# Patient Record
Sex: FEMALE | URBAN - METROPOLITAN AREA
[De-identification: names, ages, dates, MRNs, and addresses within clinical notes are randomized per-mention and may not be internally consistent; named-entity substitution may affect disease eponyms.]

---

## 2023-05-10 ENCOUNTER — OFFICE VISIT (OUTPATIENT)
Dept: OBGYN CLINIC | Age: 38
End: 2023-05-10
Payer: COMMERCIAL

## 2023-05-10 VITALS
HEIGHT: 64 IN | WEIGHT: 270 LBS | BODY MASS INDEX: 46.1 KG/M2 | DIASTOLIC BLOOD PRESSURE: 78 MMHG | SYSTOLIC BLOOD PRESSURE: 126 MMHG

## 2023-05-10 DIAGNOSIS — N93.9 ABNORMAL UTERINE BLEEDING (AUB): Primary | ICD-10-CM

## 2023-05-10 DIAGNOSIS — D21.9 FIBROIDS: ICD-10-CM

## 2023-05-10 DIAGNOSIS — Z30.011 ENCOUNTER FOR INITIAL PRESCRIPTION OF CONTRACEPTIVE PILLS: ICD-10-CM

## 2023-05-10 PROCEDURE — 99204 OFFICE O/P NEW MOD 45 MIN: CPT | Performed by: OBSTETRICS & GYNECOLOGY

## 2023-05-10 RX ORDER — M-VIT,TX,IRON,MINS/CALC/FOLIC 27MG-0.4MG
1 TABLET ORAL DAILY
COMMUNITY

## 2023-05-10 RX ORDER — ATOMOXETINE 40 MG/1
40 CAPSULE ORAL DAILY
COMMUNITY
Start: 2023-03-27

## 2023-05-10 RX ORDER — NORETHINDRONE ACETATE AND ETHINYL ESTRADIOL 1MG-20(21)
1 KIT ORAL DAILY
Qty: 1 PACKET | Refills: 3 | Status: SHIPPED | OUTPATIENT
Start: 2023-05-10

## 2023-05-10 NOTE — PROGRESS NOTES
[facility]  [address], [address], [address] [facility] Todd Grigsby MD, Berenice Newman MD, FACOG  WILSON Mathis-BC  Assessment/Plan     Sierra Goodman was seen today for new patient. Diagnoses and all orders for this visit:    Abnormal uterine bleeding (AUB)  Comments:  - normal Hgb and TSH  - US done but not available, fibroids noted  - need to review US but discussed methods to help with bleeding  - OCP, POP, DMPA, ring/pach, IUD, Nexplanon (prob not as good of an option), Myfembree, surgery are options  - she wants to try a low dose pill. VTE risk and warning signs reviewed. No CI to estrogen. Orders:  -     norethindrone-ethinyl estradiol (LOESTRIN FE 1/20) 1-20 MG-MCG per tablet; Take 1 tablet by mouth daily    Fibroids  Comments:  - may be contributing to the bleeding  - need to review US  - often can manage medically     BMI 45.0-49.9, adult ([facility] [address]  Comments:  - weight loss can sometimes help improve bleeding pattern, regulate bleeding    Encounter for initial prescription of contraceptive pills  Comments:  - no CI to combined hormones  - will try low dose OCP  - f/u 3-4 months            Subjective     Linda Nabila Vences 43 y.o. [id number] presents today for a gyn problem of uterine fibroids. Record of US not available (possibly done at [facility], PCP Garcia Dumont). She has had RUQ burning pain x 1 year. Has improved with taking Prilosec for GERD. Ultrasound done 1 month ago that showed fibroids. Has a dull RLQ pain that is sporadic and has been occurring for the last 6 months. Over the last 4-5 months, periods heavier and more painful. She has had to change a pad+ tampon every hour. Has lasted longer than usual, once 16 days, another 8 days long. She has done DMPA, patch and pills in the past. AUB with DMPA. Had to call out of work because of the bleeding. Will leak, no clots. Has to change at night.  Prior to 5 months ago, periods monthly and lasted

## 2023-05-10 NOTE — PROGRESS NOTES
New patient referred here for fibroids. Patient states US 1 mo ago through [facility] which confirmed fibroids. Patient has been having heavy/painful periods for approx 6-7 months now. Has also noticed sporadic dull pain in rt. lower abdominal area. Patient also states rt. under the breast pain infrequently for approx 1 year now. Has had an ER visit regarding this issue that result in no abnormal results. LAST PAP:  Patient states it was within a year, unsure when. Patient states hx of abnormal pap in the past, has had a LEEP and has had normal paps since. LAST MAMMO:  never     LMP:  Patient's last menstrual period was 04/26/2023 (exact date).     BIRTH CONTROL:  tubal ligation    TOBACCO USE:  No    FAMILY HISTORY OF:   Breast Cancer:  No   Ovarian Cancer:  No   Uterine Cancer:  No   Colon Cancer:  No    Vitals:    05/10/23 1012   BP: 126/78   Site: Right Upper Arm   Position: Sitting   Weight: 270 lb (122.5 kg)   Height: 5' 4\" (1.626 m)        Florinda Gabriel RN  05/10/23  10:26 AM

## 2023-05-12 NOTE — PATIENT INSTRUCTIONS
Follow up in 3-4 months, as needed or next year for your yearly female exam.  Thanks for coming to see us today and letting us take care of you!

## 2023-09-14 ENCOUNTER — OFFICE VISIT (OUTPATIENT)
Dept: OBGYN CLINIC | Age: 38
End: 2023-09-14
Payer: COMMERCIAL

## 2023-09-14 VITALS — HEIGHT: 64 IN | WEIGHT: 277 LBS | BODY MASS INDEX: 47.29 KG/M2

## 2023-09-14 DIAGNOSIS — N93.9 ABNORMAL UTERINE BLEEDING (AUB): Primary | ICD-10-CM

## 2023-09-14 DIAGNOSIS — Z30.41 SURVEILLANCE OF CONTRACEPTIVE PILL: ICD-10-CM

## 2023-09-14 DIAGNOSIS — D21.9 FIBROIDS: ICD-10-CM

## 2023-09-14 PROCEDURE — 99214 OFFICE O/P EST MOD 30 MIN: CPT | Performed by: OBSTETRICS & GYNECOLOGY

## 2023-09-14 RX ORDER — NORETHINDRONE ACETATE AND ETHINYL ESTRADIOL 1.5-30(21)
1 KIT ORAL DAILY
Qty: 1 PACKET | Refills: 3 | Status: SHIPPED | OUTPATIENT
Start: 2023-09-14

## 2023-09-14 ASSESSMENT — PATIENT HEALTH QUESTIONNAIRE - PHQ9
1. LITTLE INTEREST OR PLEASURE IN DOING THINGS: 1
2. FEELING DOWN, DEPRESSED OR HOPELESS: 1
SUM OF ALL RESPONSES TO PHQ QUESTIONS 1-9: 2
SUM OF ALL RESPONSES TO PHQ9 QUESTIONS 1 & 2: 2
SUM OF ALL RESPONSES TO PHQ QUESTIONS 1-9: 2

## 2023-09-14 NOTE — PROGRESS NOTES
[address] [facility]  [place], [address], [address] [place] [address]    Wing Rivera MD, Laurita Massey MD, WILSON Moore-BC      Assessment/Plan     [facility] was seen today for follow-up. Diagnoses and all orders for this visit:    Abnormal uterine bleeding (AUB)  Comments:  - fibroids on US  - recommend changing pill  - given info on IUD-if submucosal fibroid, may not work regardless  - possible submucosal fibroid on initial US but [address] with IM fibroid (images not available)- can request if she still has irregular bleeding  - if submucosal, may benefit from h/s myomectomy  - normal pap, Hgb 11.6, TSH wnl  Orders:  -     norethindrone-ethinyl estradiol-iron (LOESTRIN FE 1.5/30) 1.5-30 MG-MCG tablet; Take 1 tablet by mouth daily    Fibroids  Comments:  - Recent US described 3.3 cm IM fibroid  - US with innervision said 2.3 cm submucosal  - if change in pill not effective, can request images for review    BMI 45.0-49.9, adult ([facility])  Comments:  - exercise and heathy diet encouraged    Surveillance of contraceptive pill  Comments:  - will increase estrogen in pill to see if that helps with bleeding  - declines IUD or DMPA (she may have submucosal fibroid?)       F/u in 3-4 months to check on bleeding       Subjective     Linda Nabila Ru 43 y.o. [id number] presents today for a gyn problem of AUB and R sided groin pain. Recently seen in the ER and dx with diverticulitis. H/o kidney stones with lithotripsy. She was started on Loestrin 1/20 and continues to have bleeding for 2 weeks out of the month. H/o fibroids. Has also had RLQ/ R groin pain since March that feels like a dull ache on and off. Tearful. She continues to have heavy, prolonged bleeding. Prior to Jan 2023, periods would last 5-6 days, q 28 days. Now last 8-16 days. She did not do well with DMPA in the past and does not want an IUD.  Bleeding lasted 18 days in June and 2 weeks in July and August. She is tearful

## 2023-09-14 NOTE — PROGRESS NOTES
Patient here for [facility] f/u. Patient states she feels that the OCP has not been helpful with her menses. Patient states she still experiences prolonged bleeding/menses, bloating, and cramping. LAST PAP:  unknown     LAST MAMMO:  never     LMP:  Patient's last menstrual period was 08/28/2023 (exact date).     BIRTH CONTROL:  OCP (estrogen/progesterone) w/ Tubal ligation     TOBACCO USE:  No    FAMILY HISTORY OF:   Breast Cancer:  No   Ovarian Cancer:  No   Uterine Cancer:  No   Colon Cancer:  No    Vitals:    09/14/23 0835   Weight: 277 lb (125.6 kg)   Height: 5' 4\" (1.626 m)        Fermin Tristan RN  09/14/23  8:41 AM